# Patient Record
(demographics unavailable — no encounter records)

---

## 2025-07-11 NOTE — DISCUSSION/SUMMARY
[FreeTextEntry1] : Early family history of CAD/obesity/high blood pressure Lipid panel Calcium score Will have conversations with the patient regarding starting a schedule regimen for diet/exercise/possible medications No medications for the borderline BP at this point lifestyle modifications. [EKG obtained to assist in diagnosis and management of assessed problem(s)] : EKG obtained to assist in diagnosis and management of assessed problem(s)

## 2025-07-11 NOTE — HISTORY OF PRESENT ILLNESS
[FreeTextEntry1] : 36-year-old male with history of hypertension not on any medications, obesity, family history of early CAD/stroke, no toxic habits,  at school,  with 3 kids, a lot of stress in life, who has gained more weight recently, and does want to start having a plan of reducing weight.  EKG is unchanged.  Patient has no chest pain/shortness of breath/dyspnea on exertion/palpitation/syncope.  Patient is active but does not exercise.